# Patient Record
Sex: FEMALE | Race: ASIAN | NOT HISPANIC OR LATINO | Employment: STUDENT | URBAN - METROPOLITAN AREA
[De-identification: names, ages, dates, MRNs, and addresses within clinical notes are randomized per-mention and may not be internally consistent; named-entity substitution may affect disease eponyms.]

---
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
## 2021-04-25 ENCOUNTER — IMMUNIZATIONS (OUTPATIENT)
Dept: FAMILY MEDICINE CLINIC | Facility: HOSPITAL | Age: 21
End: 2021-04-25

## 2021-04-25 DIAGNOSIS — Z23 ENCOUNTER FOR IMMUNIZATION: Primary | ICD-10-CM

## 2021-04-25 PROCEDURE — 0001A SARS-COV-2 / COVID-19 MRNA VACCINE (PFIZER-BIONTECH) 30 MCG: CPT

## 2021-04-25 PROCEDURE — 91300 SARS-COV-2 / COVID-19 MRNA VACCINE (PFIZER-BIONTECH) 30 MCG: CPT

## 2021-05-19 ENCOUNTER — IMMUNIZATIONS (OUTPATIENT)
Dept: FAMILY MEDICINE CLINIC | Facility: HOSPITAL | Age: 21
End: 2021-05-19

## 2021-05-19 DIAGNOSIS — Z23 ENCOUNTER FOR IMMUNIZATION: Primary | ICD-10-CM

## 2021-05-19 PROCEDURE — 91300 SARS-COV-2 / COVID-19 MRNA VACCINE (PFIZER-BIONTECH) 30 MCG: CPT

## 2021-05-19 PROCEDURE — 0002A SARS-COV-2 / COVID-19 MRNA VACCINE (PFIZER-BIONTECH) 30 MCG: CPT

## 2021-06-25 ENCOUNTER — APPOINTMENT (OUTPATIENT)
Dept: URGENT CARE | Facility: CLINIC | Age: 21
End: 2021-06-25

## 2021-06-25 DIAGNOSIS — Z00.00 PHYSICAL EXAM: ICD-10-CM

## 2021-06-25 PROCEDURE — 86480 TB TEST CELL IMMUN MEASURE: CPT

## 2021-06-28 LAB
GAMMA INTERFERON BACKGROUND BLD IA-ACNC: 0.04 IU/ML
M TB IFN-G BLD-IMP: NEGATIVE
M TB IFN-G CD4+ BCKGRND COR BLD-ACNC: 0 IU/ML
M TB IFN-G CD4+ BCKGRND COR BLD-ACNC: 0 IU/ML
MITOGEN IGNF BCKGRD COR BLD-ACNC: >10 IU/ML

## 2021-10-13 ENCOUNTER — OFFICE VISIT (OUTPATIENT)
Dept: URGENT CARE | Age: 21
End: 2021-10-13
Payer: COMMERCIAL

## 2021-10-13 VITALS — HEART RATE: 110 BPM | RESPIRATION RATE: 20 BRPM | OXYGEN SATURATION: 98 % | TEMPERATURE: 98.1 F

## 2021-10-13 DIAGNOSIS — J20.9 ACUTE BRONCHITIS, UNSPECIFIED ORGANISM: Primary | ICD-10-CM

## 2021-10-13 DIAGNOSIS — J01.00 ACUTE MAXILLARY SINUSITIS, RECURRENCE NOT SPECIFIED: ICD-10-CM

## 2021-10-13 PROCEDURE — G0382 LEV 3 HOSP TYPE B ED VISIT: HCPCS | Performed by: PHYSICIAN ASSISTANT

## 2021-10-13 RX ORDER — AZITHROMYCIN 250 MG/1
TABLET, FILM COATED ORAL
Qty: 6 TABLET | Refills: 0 | Status: SHIPPED | OUTPATIENT
Start: 2021-10-13 | End: 2021-10-17

## 2022-01-15 ENCOUNTER — IMMUNIZATIONS (OUTPATIENT)
Dept: FAMILY MEDICINE CLINIC | Facility: HOSPITAL | Age: 22
End: 2022-01-15

## 2022-01-15 DIAGNOSIS — Z23 ENCOUNTER FOR IMMUNIZATION: Primary | ICD-10-CM

## 2022-01-15 PROCEDURE — 0001A COVID-19 PFIZER VACC 0.3 ML: CPT

## 2022-01-15 PROCEDURE — 91300 COVID-19 PFIZER VACC 0.3 ML: CPT

## 2022-12-07 ENCOUNTER — HOSPITAL ENCOUNTER (EMERGENCY)
Facility: HOSPITAL | Age: 22
Discharge: HOME | End: 2022-12-07
Attending: EMERGENCY MEDICINE

## 2022-12-07 VITALS
HEART RATE: 88 BPM | TEMPERATURE: 98.2 F | OXYGEN SATURATION: 98 % | SYSTOLIC BLOOD PRESSURE: 136 MMHG | DIASTOLIC BLOOD PRESSURE: 86 MMHG | RESPIRATION RATE: 18 BRPM

## 2022-12-07 DIAGNOSIS — Z57.8 EMPLOYEE EXPOSURE TO BODY FLUIDS: Primary | ICD-10-CM

## 2022-12-07 LAB — ALT SERPL W P-5'-P-CCNC: 11 U/L (ref 7–52)

## 2022-12-07 SDOH — HEALTH STABILITY - PHYSICAL HEALTH: OCCUPATIONAL EXPOSURE TO OTHER RISK FACTORS: Z57.8

## 2022-12-08 LAB
HBV SURFACE AB SER-ACNC: 93.29 MIU/ML
HBV SURFACE AG SER QL: NORMAL
HCV AB SER QL: NORMAL
HIV 1+2 AB+HIV1 P24 AG SERPL QL IA: NORMAL

## 2022-12-08 NOTE — ED PROVIDER NOTES
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 and some fluid splashed in her eye tonight when she was emptying it  Did not irrigate eye prior to arrival to ED and I told patient that she can wash her eye out in ED  No blurry vision  Imms UTD  Last Tdap was in September this year given at [facility]  No h/o HIV in source patient  Source patient does have MRSA and MRDO  No recent old records from this ED seen on computer system  [facility] website checked on this patient and no Rx found  D/w patient that no need for PEP since extremely low likelihood of HIV transmission  History provided by:  Patient   used: No        Prior to Admission Medications   Prescriptions Last Dose Informant Patient Reported? Taking? Ascorbic Acid (VITAMIN C) 1000 MG tablet   Yes No   Sig: Take 1,000 mg by mouth daily   Multiple Vitamins-Minerals (MULTIVITAMIN WITH MINERALS) tablet   Yes No   Sig: Take 1 tablet by mouth daily   Probiotic Product (PROBIOTIC-10 PO)   Yes No   Sig: Take by mouth      Facility-Administered Medications: None       Past Medical History:   Diagnosis Date   • Well adolescent visit without abnormal findings 6/18/2018   • Well adult exam 7/2/2019       Past Surgical History:   Procedure Laterality Date   • NO PAST SURGERIES         Family History   Problem Relation Age of Onset   • Breast cancer Sister    • Breast cancer Maternal Aunt      I have reviewed and agree with the history as documented  E-Cigarette/Vaping     E-Cigarette/Vaping Substances     Social History     Tobacco Use   • Smoking status: Never   • Smokeless tobacco: Never   Substance Use Topics   • Alcohol use: No   • Drug use: No       Review of Systems   Eyes: Negative for visual disturbance  Physical Exam  Physical Exam  Vitals and nursing note reviewed  Constitutional:       General: She is not in acute distress  Eyes:      General: No scleral icterus  Right eye: No discharge  Left eye: No discharge  Extraocular Movements: Extraocular movements intact  Pupils: Pupils are equal, round, and reactive to light  Skin:     General: Skin is warm and dry  Findings: No erythema or rash  Neurological:      Mental Status: She is alert  Vital Signs  ED Triage Vitals   Temperature Pulse Respirations Blood Pressure SpO2   12/07/22 2043 12/07/22 2042 12/07/22 2042 12/07/22 2042 12/07/22 2042   98 2 °F (36 8 °C) 88 18 136/86 98 %      Temp Source Heart Rate Source Patient Position - Orthostatic VS BP Location FiO2 (%)   12/07/22 2043 12/07/22 2042 12/07/22 2042 12/07/22 2042 --   Oral Monitor Sitting Right arm       Pain Score       --                  Vitals:    12/07/22 2042   BP: 136/86   Pulse: 88   Patient Position - Orthostatic VS: Sitting         Visual Acuity      ED Medications  Medications - No data to display    Diagnostic Studies  Results Reviewed     Procedure Component Value Units Date/Time    ALT [[id number]]  (Normal) Collected: 12/07/22 2213    Lab Status: Final result Specimen: Blood from Arm, Left Updated: 12/07/22 2235     ALT 11 U/L     Hepatitis B surface antigen [[id number]] Collected: 12/07/22 2213    Lab Status: In process Specimen: Blood from Arm, Left Updated: 12/07/22 2218    Hepatitis C antibody [[id number]] Collected: 12/07/22 2213    Lab Status: In process Specimen: Blood from Arm, Left Updated: 12/07/22 2218    Hepatitis B surface antibody [[id number]] Collected: 12/07/22 2213    Lab Status: In process Specimen: Blood from Arm, Left Updated: 12/07/22 2218    [facility] [[id number]] Collected: 12/07/22 2213    Lab Status:  In process Specimen: Blood from Arm, Left Updated: 12/07/22 2218                 No orders to display              Procedures  Procedures         ED Course                                             MDM  Number of Diagnoses or Management Options  Diagnosis management comments: DDx including but not limited to:  body fluid exposure, conjunctivitis; no need for tetanus prophylaxis, hepatitis B prophylaxis, PEP for HIV; doubt foreign body or evidence of infection  Amount and/or Complexity of Data Reviewed  Clinical lab tests: ordered  Decide to obtain previous medical records or to obtain history from someone other than the patient: yes        Disposition  Final diagnoses:   Employee exposure to body fluids     Time reflects when diagnosis was documented in both MDM as applicable and the Disposition within this note     Time User Action Codes Description Comment    12/7/2022 10:39 PM Domenic Tafoya [Z57 8] Employee exposure to body fluids       ED Disposition     ED Disposition   Discharge    Condition   Stable    Date/Time   Wed Dec 7, 2022 10:39 PM    Comment   Mikal Crane discharge to home/self care  Follow-up Information     Follow up With Specialties Details Why [address] [facility]  Call in 1 day return sooner if blurry vision, fever, vomiting, difficulty breathing, rash  [address] [place] [address]  [phone]          Patient's Medications   Discharge Prescriptions    No medications on file       No discharge procedures on file      PDMP Review       Value Time User    PDMP Reviewed  Yes 12/7/2022 10:29 PM Maren Meek MD          ED Provider  Electronically Signed by           Maren Meek MD  12/07/22 4121

## 2023-02-03 ENCOUNTER — HOSPITAL ENCOUNTER (EMERGENCY)
Facility: HOSPITAL | Age: 23
Discharge: HOME | End: 2023-02-04
Attending: EMERGENCY MEDICINE

## 2023-02-03 VITALS
HEART RATE: 83 BPM | WEIGHT: 102 LBS | OXYGEN SATURATION: 99 % | DIASTOLIC BLOOD PRESSURE: 91 MMHG | TEMPERATURE: 98.2 F | RESPIRATION RATE: 17 BRPM | BODY MASS INDEX: 17.89 KG/M2 | SYSTOLIC BLOOD PRESSURE: 143 MMHG

## 2023-02-03 DIAGNOSIS — N39.0 URINARY TRACT INFECTION: Primary | ICD-10-CM

## 2023-02-03 LAB
BACTERIA UR QL AUTO: ABNORMAL /HPF
BILIRUB UR QL STRIP: NEGATIVE
CLARITY UR: ABNORMAL
COLOR UR: ABNORMAL
EXT PREGNANCY TEST URINE: NEGATIVE
EXT. CONTROL: NORMAL
GLUCOSE UR STRIP-MCNC: NEGATIVE MG/DL
HGB UR QL STRIP.AUTO: ABNORMAL
KETONES UR STRIP-MCNC: NEGATIVE MG/DL
LEUKOCYTE ESTERASE UR QL STRIP: ABNORMAL
NITRITE UR QL STRIP: NEGATIVE
NON-SQ EPI CELLS URNS QL MICRO: ABNORMAL /HPF
PH UR STRIP.AUTO: 6.5 [PH]
PROT UR STRIP-MCNC: ABNORMAL MG/DL
RBC #/AREA URNS AUTO: ABNORMAL /HPF
SP GR UR STRIP.AUTO: 1 (ref 1–1.03)
UROBILINOGEN UR STRIP-ACNC: <2 MG/DL
WBC #/AREA URNS AUTO: ABNORMAL /HPF

## 2023-02-04 RX ORDER — SULFAMETHOXAZOLE AND TRIMETHOPRIM 800; 160 MG/1; MG/1
1 TABLET ORAL ONCE
Status: COMPLETED | OUTPATIENT
Start: 2023-02-04 | End: 2023-02-04

## 2023-02-04 RX ORDER — SULFAMETHOXAZOLE AND TRIMETHOPRIM 800; 160 MG/1; MG/1
1 TABLET ORAL 2 TIMES DAILY
Qty: 13 TABLET | Refills: 0 | Status: SHIPPED | OUTPATIENT
Start: 2023-02-04 | End: 2023-02-11

## 2023-02-04 RX ORDER — PHENAZOPYRIDINE HYDROCHLORIDE 200 MG/1
200 TABLET, FILM COATED ORAL 3 TIMES DAILY
Qty: 6 TABLET | Refills: 0 | Status: SHIPPED | OUTPATIENT
Start: 2023-02-04

## 2023-02-04 RX ADMIN — SULFAMETHOXAZOLE AND TRIMETHOPRIM 1 TABLET: 800; 160 TABLET ORAL at 00:34

## 2023-02-04 NOTE — ED PROVIDER NOTES
History  Chief Complaint   Patient presents with   • Painful Urination     Pt c/o painful urination with blood in urine x 3 days  Pt states menstrual cycle ended 3 days ago  Pt states this feels "different and uncomfortable"  Pt c/OBJECTIVE: increased frequency of urination  [facility] is a pleasant 25 yof who presents with 2-3 days of increased urinary frequency and dysuria, with onset of mild suprapubic cramping to aching, non-radiating pain, mild in severity, accompanied by gross hematuria today  States that she usually has dysuria and increased urinary frequency at the conclusion of her menstrual cycles, however has never experienced the suprapubic pain or hematuria  LMP ended 3 days ago, was uncomplicated  Denies fever, chills, weakness, lightheadedness, upper abdominal pain, flank or back pain, nausea, vomiting, diarrhea, constipation, vaginal bleeding or discharge, vaginal pain, vaginal itching,  rash, or peripheral edema  No recent illnesses or sick contacts  Denies history of kidney issues  Denies muscle pain or recent strenuous exercise  No recent abdominal or thoracic trauma  No known allergies  Denies possibility of STI or pregnancy  Prior to Admission Medications   Prescriptions Last Dose Informant Patient Reported? Taking?    Ascorbic Acid (VITAMIN C) 1000 MG tablet   Yes No   Sig: Take 1,000 mg by mouth daily   Multiple Vitamins-Minerals (MULTIVITAMIN WITH MINERALS) tablet   Yes No   Sig: Take 1 tablet by mouth daily   Probiotic Product (PROBIOTIC-10 PO)   Yes No   Sig: Take by mouth      Facility-Administered Medications: None       Past Medical History:   Diagnosis Date   • Well adolescent visit without abnormal findings 6/18/2018   • Well adult exam 7/2/2019       Past Surgical History:   Procedure Laterality Date   • NO PAST SURGERIES         Family History   Problem Relation Age of Onset   • Breast cancer Sister    • Breast cancer Maternal Aunt      I have reviewed and agree with the history as documented  E-Cigarette/Vaping     E-Cigarette/Vaping Substances     Social History     Tobacco Use   • Smoking status: Never   • Smokeless tobacco: Never   Substance Use Topics   • Alcohol use: Yes     Comment: socially   • Drug use: No        Review of Systems   Constitutional: Negative  Negative for appetite change, chills, diaphoresis, fatigue and fever  HENT: Negative  Eyes: Negative  Respiratory: Negative  Cardiovascular: Negative  Gastrointestinal: Positive for abdominal pain  Negative for abdominal distention, constipation, diarrhea, nausea and vomiting  Endocrine: Negative  Genitourinary: Positive for dysuria, frequency, hematuria and pelvic pain  Negative for decreased urine volume, difficulty urinating, flank pain, menstrual problem, urgency, vaginal bleeding, vaginal discharge and vaginal pain  Musculoskeletal: Negative  Negative for arthralgias, back pain, joint swelling, myalgias and neck pain  Skin: Negative  Negative for pallor and rash  Allergic/Immunologic: Negative  Neurological: Negative  Negative for dizziness, weakness, light-headedness and headaches  Hematological: Negative  Does not bruise/bleed easily  Psychiatric/Behavioral: Negative  All other systems reviewed and are negative  Physical Exam  ED Triage Vitals [02/03/23 2231]   Temperature Pulse Respirations Blood Pressure SpO2   98 2 °F (36 8 °C) 83 17 143/91 99 %      Temp Source Heart Rate Source Patient Position - Orthostatic VS BP Location FiO2 (%)   Oral Monitor -- Right arm --      Pain Score       2             Orthostatic Vital Signs  Vitals:    02/03/23 2231   BP: 143/91   Pulse: 83       Physical Exam  Vitals and nursing note reviewed  Exam conducted with a chaperone present  Constitutional:       General: She is not in acute distress  Appearance: Normal appearance  She is not ill-appearing  HENT:      Head: Normocephalic        Right Ear: External ear normal  Left Ear: External ear normal       Nose: Nose normal  No congestion or rhinorrhea  Mouth/Throat:      Mouth: Mucous membranes are moist       Pharynx: Oropharynx is clear  No oropharyngeal exudate or posterior oropharyngeal erythema  Eyes:      Extraocular Movements: Extraocular movements intact  Conjunctiva/sclera: Conjunctivae normal    Cardiovascular:      Rate and Rhythm: Normal rate and regular rhythm  Pulses: Normal pulses  Heart sounds: Normal heart sounds  No murmur heard  No friction rub  No gallop  Pulmonary:      Effort: Pulmonary effort is normal  No respiratory distress  Breath sounds: Normal breath sounds  Abdominal:      General: Abdomen is flat  Bowel sounds are normal  There is no distension  Palpations: Abdomen is soft  Tenderness: There is abdominal tenderness in the suprapubic area  There is no right CVA tenderness, left CVA tenderness, guarding or rebound  Negative signs include McBurney's sign and psoas sign  Musculoskeletal:         General: Normal range of motion  Cervical back: Normal range of motion and neck supple  Right lower leg: No edema  Left lower leg: No edema  Skin:     General: Skin is warm and dry  Capillary Refill: Capillary refill takes less than 2 seconds  Coloration: Skin is not pale  Findings: No rash  Neurological:      General: No focal deficit present  Mental Status: She is alert and oriented to person, place, and time           ED Medications  Medications   sulfamethoxazole-trimethoprim (BACTRIM DS) 800-160 mg per tablet 1 tablet (1 tablet Oral Given 2/4/23 0034)       Diagnostic Studies  Results Reviewed     Procedure Component Value Units Date/Time    Urine Microscopic [[id number]]  (Abnormal) Collected: 02/03/23 6146    Lab Status: Final result Specimen: Urine, Clean Catch Updated: 02/03/23 0528     RBC, UA None Seen /hpf      WBC, UA 30-50 /hpf      Epithelial Cells Occasional /hpf Bacteria, UA Occasional /hpf     Urine culture [[id number]] Collected: 02/03/23 2254    Lab Status: In process Specimen: Urine, Clean Catch Updated: 02/03/23 2358    UA w Reflex to Microscopic w Reflex to Culture [[id number]]  (Abnormal) Collected: 02/03/23 2254    Lab Status: Final result Specimen: Urine, Clean Catch Updated: 02/03/23 2315     Color, UA Light Brown     Clarity, UA Turbid     Specific [place], UA 1 003     pH, UA 6 5     Leukocytes, UA Large     Nitrite, UA Negative     Protein, UA 30 (1+) mg/dl      Glucose, UA Negative mg/dl      Ketones, UA Negative mg/dl      Urobilinogen, UA <2 0 mg/dl      Bilirubin, UA Negative     Occult Blood, UA Large    POCT pregnancy, urine [[id number]]  (Normal) Resulted: 02/03/23 2300    Lab Status: Final result Updated: 02/03/23 2300     EXT Preg Test, Ur Negative     Control Valid                 No orders to display         Procedures  Procedures      ED Course  ED Course as of 02/04/23 0135   Fri Feb 03, 2023 2243 Pt is a 25 yof with no known medical problems who presents with 2-3 days of dysuria and increased urinary frequency, with onset of gross hematuria and suprapubic aching this afternoon  LMP ended three days ago without incident, denies fever, chills, nausea, vomiting, diarrhea, constipation, flank pain, vaginal bleeding, or vaginal discharge  Denies myalgias or strenuous exercise  Exam with VS WNL, abd soft with mild suprapubic tenderness to palpation, no CVA tenderness, no rash or peripheral edema  Will obtain UA given high suspicion for UTI/cystitis  Doubt pyelonephritis, nephrolithiasis in absence of flank pain  Doubt GI cause given lack of GI-type symptoms  2305 POCT pregnancy, urine  Negative     2325 UA w Reflex to Microscopic w Reflex to Culture(!)  UA + leuk esterase, blood, concerning for UTI, pending micro  2358 WBC, UA(!): 30-50   Sat Feb 04, 2023   0014 Will d/c on bactrim, pyridium  First dose bactrim given prior to discharge    Strict return precautions discussed, f/u instructions given, all questions answered  Medical Decision Making  See ED course for MDM  Urinary tract infection: acute illness or injury  Amount and/or Complexity of Data Reviewed  Labs: ordered  Decision-making details documented in ED Course  Risk  Prescription drug management  Disposition  Final diagnoses:   Urinary tract infection     Time reflects when diagnosis was documented in both MDM as applicable and the Disposition within this note     Time User Action Codes Description Comment    2/4/2023 12:04 AM Kimberly Bagdad Add [N30 91] Hemorrhagic cystitis     2/4/2023 12:04 AM Kimberly David Add [N39 0] Urinary tract infection     2/4/2023 12:05 AM Kimberly David Modify [N39 0] Urinary tract infection     2/4/2023 12:05 AM Kimberly David Remove [N30 91] Hemorrhagic cystitis       ED Disposition     ED Disposition   Discharge    Condition   Stable    Date/Time   Sat Feb 4, 2023 12:04 AM    Comment   Trudell Factor Royce discharge to home/self care                 Follow-up Information     Follow up With Specialties Details Why [address], DO Family Medicine   [address]  [facility] Alfredo [address]  [phone]            Discharge Medication List as of 2/4/2023 12:07 AM      START taking these medications    Details   phenazopyridine (PYRIDIUM) 200 mg tablet Take 1 tablet (200 mg total) by mouth 3 (three) times a day, Starting Sat 2/4/2023, Normal      sulfamethoxazole-trimethoprim (BACTRIM DS) 800-160 mg per tablet Take 1 tablet by mouth 2 (two) times a day for 7 days smx-tmp DS (BACTRIM) 800-160 mg tabs (1tab q12 D10), Starting Sat 2/4/2023, Until Sat 2/11/2023, Normal         CONTINUE these medications which have NOT CHANGED    Details   Ascorbic Acid (VITAMIN C) 1000 MG tablet Take 1,000 mg by mouth daily, Historical Med      Multiple Vitamins-Minerals (MULTIVITAMIN WITH MINERALS) tablet Take 1 tablet by mouth daily, Historical Med      Probiotic Product (PROBIOTIC-10 PO) Take by mouth, Historical Med           No discharge procedures on file  PDMP Review       Value Time User    PDMP Reviewed  Yes 2/3/2023 10:31 PM Blanca Carroll MD           ED Provider  Attending physically available and evaluated [facility]  I managed the patient along with the ED Attending      Electronically Signed by         Jacque Momin DO  02/04/23 0136

## 2023-02-04 NOTE — DISCHARGE INSTRUCTIONS
The pyridium may cause your urine to turn orange  This is normal     Please follow up with your family doctor in one week to ensure that your UTI has resolved  Please return to the ER if you have worsening of symptoms or develop fever, chills, weakness, or back pain

## 2023-02-04 NOTE — ED ATTENDING ATTESTATION
2/3/2023  IMiranda MD, saw and evaluated the patient  I have discussed the patient with the resident/non-physician practitioner and agree with the resident's/non-physician practitioner's findings, Plan of Care, and MDM as documented in the resident's/non-physician practitioner's note, except where noted  All available labs and Radiology studies were reviewed  I was present for key portions of any procedure(s) performed by the resident/non-physician practitioner and I was immediately available to provide assistance  At this point I agree with the current assessment done in the Emergency Department  I have conducted an independent evaluation of this patient a history and physical is as follows:  Patient is a 25year old female with dysuria and frequency for past few days and LMP 3 days ago  No back pain  (+) suprapubic pain with hematuria  No fever  No N/V  Was last seen in this ED on 12/7/22 for employee exposure to body fluids  [facility] website checked on this patient and no Rx found  NCAT  Moist mucous membranes  Lungs clear  Heart regular without murmur  Mild suprapubic tenderness  Good bowel sounds  No abdominal distension  No pallor  No rash  No edema  DDx including but not limited to: UTI, hemorrhagic cystitis, interstitial cystitis; doubt vaginitis; doubt acute surgical intraabdominal process or renal colic or pyelonephritis  Will check urine       ED Course         Critical Care Time  Procedures

## 2023-02-05 LAB — BACTERIA UR CULT: NORMAL

## 2023-02-06 LAB — BACTERIA UR CULT: NORMAL

## 2023-09-21 ENCOUNTER — TELEPHONE (OUTPATIENT)
Age: 23
End: 2023-09-21

## 2023-09-21 NOTE — TELEPHONE ENCOUNTER
The patient call to request a copy of her Immunization records     Please send this information at   [email or web address]

## 2023-09-29 ENCOUNTER — TELEPHONE (OUTPATIENT)
Age: 23
End: 2023-09-29

## 2023-09-29 NOTE — TELEPHONE ENCOUNTER
Spoke with patient, patient had called in to request her immunization records to be faxed to [phone]. Please advise.